# Patient Record
Sex: FEMALE | Race: WHITE
[De-identification: names, ages, dates, MRNs, and addresses within clinical notes are randomized per-mention and may not be internally consistent; named-entity substitution may affect disease eponyms.]

---

## 2019-12-12 ENCOUNTER — HOSPITAL ENCOUNTER (OUTPATIENT)
Dept: HOSPITAL 90 - SHCH | Age: 79
Discharge: HOME | End: 2019-12-12
Attending: INTERNAL MEDICINE
Payer: MEDICARE

## 2019-12-12 DIAGNOSIS — I87.2: ICD-10-CM

## 2019-12-12 DIAGNOSIS — I73.9: Primary | ICD-10-CM

## 2019-12-12 PROCEDURE — 93925 LOWER EXTREMITY STUDY: CPT

## 2019-12-12 PROCEDURE — 93970 EXTREMITY STUDY: CPT

## 2021-03-05 ENCOUNTER — HOSPITAL ENCOUNTER (OUTPATIENT)
Dept: HOSPITAL 90 - RAH | Age: 81
Discharge: HOME | End: 2021-03-05
Attending: INTERNAL MEDICINE
Payer: MEDICARE

## 2021-03-05 DIAGNOSIS — K22.5: Primary | ICD-10-CM

## 2021-03-05 DIAGNOSIS — K44.9: ICD-10-CM

## 2021-03-05 PROCEDURE — 74220 X-RAY XM ESOPHAGUS 1CNTRST: CPT

## 2022-08-08 ENCOUNTER — HOSPITAL ENCOUNTER (EMERGENCY)
Dept: HOSPITAL 90 - EDH | Age: 82
Discharge: HOME | End: 2022-08-08
Payer: MEDICARE

## 2022-08-08 VITALS — WEIGHT: 188.94 LBS | BODY MASS INDEX: 28.63 KG/M2 | HEIGHT: 68 IN

## 2022-08-08 VITALS — DIASTOLIC BLOOD PRESSURE: 86 MMHG | SYSTOLIC BLOOD PRESSURE: 153 MMHG

## 2022-08-08 DIAGNOSIS — I25.10: ICD-10-CM

## 2022-08-08 DIAGNOSIS — E78.00: ICD-10-CM

## 2022-08-08 DIAGNOSIS — Z98.890: ICD-10-CM

## 2022-08-08 DIAGNOSIS — I48.91: ICD-10-CM

## 2022-08-08 DIAGNOSIS — Z79.899: ICD-10-CM

## 2022-08-08 DIAGNOSIS — I10: Primary | ICD-10-CM

## 2022-08-08 LAB
ALBUMIN SERPL-MCNC: 3.4 G/DL (ref 3.5–5)
ALT SERPL-CCNC: 22 U/L (ref 12–78)
AST SERPL-CCNC: 16 U/L (ref 10–37)
BASOPHILS NFR BLD AUTO: 0.8 % (ref 0–5)
BUN SERPL-MCNC: 11 MG/DL (ref 7–18)
CHLORIDE SERPL-SCNC: 94 MMOL/L (ref 101–111)
CO2 SERPL-SCNC: 30 MMOL/L (ref 21–32)
CREAT SERPL-MCNC: 1 MG/DL (ref 0.5–1.5)
EOSINOPHIL NFR BLD AUTO: 2.6 % (ref 0–8)
ERYTHROCYTE [DISTWIDTH] IN BLOOD BY AUTOMATED COUNT: 13.3 % (ref 11–15.5)
GFR SERPL CREATININE-BSD FRML MDRD: 57 ML/MIN (ref 60–?)
GLUCOSE SERPL-MCNC: 113 MG/DL (ref 70–105)
HCT VFR BLD AUTO: 41.6 % (ref 36–48)
LYMPHOCYTES NFR SPEC AUTO: 13.4 % (ref 21–51)
MCH RBC QN AUTO: 32.4 PG (ref 27–33)
MCHC RBC AUTO-ENTMCNC: 34.4 G/DL (ref 32–36)
MCV RBC AUTO: 94.3 FL (ref 79–99)
MONOCYTES NFR BLD AUTO: 9.4 % (ref 3–13)
NEUTROPHILS NFR BLD AUTO: 73.4 % (ref 40–77)
NRBC BLD MANUAL-RTO: 0 % (ref 0–0.19)
PLATELET # BLD AUTO: 269 K/UL (ref 130–400)
POTASSIUM SERPL-SCNC: 5.1 MMOL/L (ref 3.5–5.1)
PROT SERPL-MCNC: 7.2 G/DL (ref 6–8.3)
RBC # BLD AUTO: 4.41 MIL/UL (ref 4–5.5)
SODIUM SERPL-SCNC: 132 MMOL/L (ref 136–145)
WBC # BLD AUTO: 7.8 K/UL (ref 4.8–10.8)

## 2022-08-08 PROCEDURE — 85025 COMPLETE CBC W/AUTO DIFF WBC: CPT

## 2022-08-08 PROCEDURE — 36415 COLL VENOUS BLD VENIPUNCTURE: CPT

## 2022-08-08 PROCEDURE — 84484 ASSAY OF TROPONIN QUANT: CPT

## 2022-08-08 PROCEDURE — 80053 COMPREHEN METABOLIC PANEL: CPT

## 2022-08-12 ENCOUNTER — HOSPITAL ENCOUNTER (EMERGENCY)
Dept: HOSPITAL 90 - EDH | Age: 82
Discharge: HOME | End: 2022-08-12
Payer: MEDICARE

## 2022-08-12 VITALS — DIASTOLIC BLOOD PRESSURE: 105 MMHG | SYSTOLIC BLOOD PRESSURE: 158 MMHG

## 2022-08-12 VITALS — HEIGHT: 68 IN | WEIGHT: 186.95 LBS | BODY MASS INDEX: 28.33 KG/M2

## 2022-08-12 DIAGNOSIS — E86.9: ICD-10-CM

## 2022-08-12 DIAGNOSIS — Z79.899: ICD-10-CM

## 2022-08-12 DIAGNOSIS — J44.9: ICD-10-CM

## 2022-08-12 DIAGNOSIS — E78.00: ICD-10-CM

## 2022-08-12 DIAGNOSIS — I25.10: ICD-10-CM

## 2022-08-12 DIAGNOSIS — I48.91: ICD-10-CM

## 2022-08-12 DIAGNOSIS — I10: Primary | ICD-10-CM

## 2022-08-12 LAB
ALBUMIN SERPL-MCNC: 3.5 G/DL (ref 3.5–5)
ALT SERPL-CCNC: 63 U/L (ref 12–78)
APPEARANCE UR: (no result)
AST SERPL-CCNC: 56 U/L (ref 10–37)
BASOPHILS NFR BLD AUTO: 1.1 % (ref 0–5)
BILIRUB UR QL STRIP: NEGATIVE
BNP SERPL-MCNC: 620 PG/ML (ref 0–100)
BUN SERPL-MCNC: 12 MG/DL (ref 7–18)
CHLORIDE SERPL-SCNC: 95 MMOL/L (ref 101–111)
CO2 SERPL-SCNC: 29 MMOL/L (ref 21–32)
COLOR UR: YELLOW
CREAT SERPL-MCNC: 0.9 MG/DL (ref 0.5–1.5)
DEPRECATED SQUAMOUS URNS QL MICRO: (no result) /HPF (ref 0–2)
EOSINOPHIL NFR BLD AUTO: 3.3 % (ref 0–8)
ERYTHROCYTE [DISTWIDTH] IN BLOOD BY AUTOMATED COUNT: 13.8 % (ref 11–15.5)
GFR SERPL CREATININE-BSD FRML MDRD: 64 ML/MIN (ref 60–?)
GLUCOSE SERPL-MCNC: 107 MG/DL (ref 70–105)
GLUCOSE UR STRIP-MCNC: NEGATIVE MG/DL
HCT VFR BLD AUTO: 42 % (ref 36–48)
HGB UR QL STRIP: NEGATIVE
KETONES UR STRIP-MCNC: NEGATIVE MG/DL
LEUKOCYTE ESTERASE UR QL STRIP: (no result)
LYMPHOCYTES NFR SPEC AUTO: 14.3 % (ref 21–51)
MCH RBC QN AUTO: 32.6 PG (ref 27–33)
MCHC RBC AUTO-ENTMCNC: 34 G/DL (ref 32–36)
MCV RBC AUTO: 95.7 FL (ref 79–99)
MONOCYTES NFR BLD AUTO: 8.4 % (ref 3–13)
NEUTROPHILS NFR BLD AUTO: 72.7 % (ref 40–77)
NITRITE UR QL STRIP: NEGATIVE
NRBC BLD MANUAL-RTO: 0 % (ref 0–0.19)
PH UR STRIP: 6.5 [PH] (ref 5–8)
PLATELET # BLD AUTO: 292 K/UL (ref 130–400)
POTASSIUM SERPL-SCNC: 5.3 MMOL/L (ref 3.5–5.1)
PROT SERPL-MCNC: 7.4 G/DL (ref 6–8.3)
PROT UR QL STRIP: 30 MG/DL
RBC # BLD AUTO: 4.39 MIL/UL (ref 4–5.5)
SODIUM SERPL-SCNC: 130 MMOL/L (ref 136–145)
SP GR UR STRIP: 1.01 (ref 1–1.03)
UROBILINOGEN UR STRIP-MCNC: 1 MG/DL (ref 0.2–1)
WBC # BLD AUTO: 9 K/UL (ref 4.8–10.8)
WBC #/AREA URNS HPF: (no result) /HPF (ref 0–1)

## 2022-08-12 PROCEDURE — 36415 COLL VENOUS BLD VENIPUNCTURE: CPT

## 2022-08-12 PROCEDURE — 87088 URINE BACTERIA CULTURE: CPT

## 2022-08-12 PROCEDURE — 81001 URINALYSIS AUTO W/SCOPE: CPT

## 2022-08-12 PROCEDURE — 85025 COMPLETE CBC W/AUTO DIFF WBC: CPT

## 2022-08-12 PROCEDURE — 83880 ASSAY OF NATRIURETIC PEPTIDE: CPT

## 2022-08-12 PROCEDURE — 84484 ASSAY OF TROPONIN QUANT: CPT

## 2022-08-12 PROCEDURE — 71045 X-RAY EXAM CHEST 1 VIEW: CPT

## 2022-08-12 PROCEDURE — 93005 ELECTROCARDIOGRAM TRACING: CPT

## 2022-08-12 PROCEDURE — 80053 COMPREHEN METABOLIC PANEL: CPT

## 2022-08-25 ENCOUNTER — HOSPITAL ENCOUNTER (EMERGENCY)
Dept: HOSPITAL 90 - EDH | Age: 82
Discharge: HOME | End: 2022-08-25
Payer: MEDICARE

## 2022-08-25 VITALS — WEIGHT: 186.95 LBS | HEIGHT: 68 IN | BODY MASS INDEX: 28.33 KG/M2

## 2022-08-25 VITALS — DIASTOLIC BLOOD PRESSURE: 63 MMHG | SYSTOLIC BLOOD PRESSURE: 139 MMHG

## 2022-08-25 DIAGNOSIS — F41.9: Primary | ICD-10-CM

## 2022-08-25 DIAGNOSIS — I48.91: ICD-10-CM

## 2022-08-25 DIAGNOSIS — Z79.899: ICD-10-CM

## 2022-08-25 DIAGNOSIS — R03.0: ICD-10-CM

## 2022-08-25 DIAGNOSIS — J44.9: ICD-10-CM

## 2022-08-25 LAB
ALBUMIN SERPL-MCNC: 3.3 G/DL (ref 3.5–5)
ALT SERPL-CCNC: 128 U/L (ref 12–78)
AST SERPL-CCNC: 153 U/L (ref 10–37)
BASOPHILS NFR BLD AUTO: 0.8 % (ref 0–5)
BNP SERPL-MCNC: 470 PG/ML (ref 0–100)
BUN SERPL-MCNC: 15 MG/DL (ref 7–18)
CHLORIDE SERPL-SCNC: 96 MMOL/L (ref 101–111)
CO2 SERPL-SCNC: 30 MMOL/L (ref 21–32)
CREAT SERPL-MCNC: 1 MG/DL (ref 0.5–1.5)
EOSINOPHIL NFR BLD AUTO: 2.3 % (ref 0–8)
ERYTHROCYTE [DISTWIDTH] IN BLOOD BY AUTOMATED COUNT: 13.9 % (ref 11–15.5)
GFR SERPL CREATININE-BSD FRML MDRD: 57 ML/MIN (ref 60–?)
GLUCOSE SERPL-MCNC: 108 MG/DL (ref 70–105)
HCT VFR BLD AUTO: 43.1 % (ref 36–48)
LYMPHOCYTES NFR SPEC AUTO: 10.7 % (ref 21–51)
MAGNESIUM SERPL-MCNC: 1.6 MG/DL (ref 1.8–2.4)
MCH RBC QN AUTO: 32.3 PG (ref 27–33)
MCHC RBC AUTO-ENTMCNC: 33.4 G/DL (ref 32–36)
MCV RBC AUTO: 96.6 FL (ref 79–99)
MONOCYTES NFR BLD AUTO: 9.4 % (ref 3–13)
NEUTROPHILS NFR BLD AUTO: 76.4 % (ref 40–77)
NRBC BLD MANUAL-RTO: 0 % (ref 0–0.19)
PLATELET # BLD AUTO: 284 K/UL (ref 130–400)
POTASSIUM SERPL-SCNC: 4.6 MMOL/L (ref 3.5–5.1)
PROT SERPL-MCNC: 6.8 G/DL (ref 6–8.3)
RBC # BLD AUTO: 4.46 MIL/UL (ref 4–5.5)
SODIUM SERPL-SCNC: 134 MMOL/L (ref 136–145)
WBC # BLD AUTO: 8.5 K/UL (ref 4.8–10.8)

## 2022-08-25 PROCEDURE — 80053 COMPREHEN METABOLIC PANEL: CPT

## 2022-08-25 PROCEDURE — 96374 THER/PROPH/DIAG INJ IV PUSH: CPT

## 2022-08-25 PROCEDURE — 83880 ASSAY OF NATRIURETIC PEPTIDE: CPT

## 2022-08-25 PROCEDURE — 99285 EMERGENCY DEPT VISIT HI MDM: CPT

## 2022-08-25 PROCEDURE — 71045 X-RAY EXAM CHEST 1 VIEW: CPT

## 2022-08-25 PROCEDURE — 83735 ASSAY OF MAGNESIUM: CPT

## 2022-08-25 PROCEDURE — 85025 COMPLETE CBC W/AUTO DIFF WBC: CPT

## 2022-08-25 PROCEDURE — 36415 COLL VENOUS BLD VENIPUNCTURE: CPT

## 2022-08-25 PROCEDURE — 93005 ELECTROCARDIOGRAM TRACING: CPT

## 2022-08-25 PROCEDURE — 84484 ASSAY OF TROPONIN QUANT: CPT

## 2022-08-28 ENCOUNTER — HOSPITAL ENCOUNTER (INPATIENT)
Dept: HOSPITAL 90 - EDH | Age: 82
LOS: 5 days | Discharge: HOME | DRG: 291 | End: 2022-09-02
Attending: INTERNAL MEDICINE | Admitting: INTERNAL MEDICINE
Payer: MEDICARE

## 2022-08-28 VITALS — WEIGHT: 181.4 LBS | BODY MASS INDEX: 29.15 KG/M2 | HEIGHT: 66 IN

## 2022-08-28 VITALS — SYSTOLIC BLOOD PRESSURE: 155 MMHG | DIASTOLIC BLOOD PRESSURE: 88 MMHG

## 2022-08-28 VITALS — DIASTOLIC BLOOD PRESSURE: 105 MMHG | SYSTOLIC BLOOD PRESSURE: 168 MMHG

## 2022-08-28 DIAGNOSIS — Z90.710: ICD-10-CM

## 2022-08-28 DIAGNOSIS — L97.919: ICD-10-CM

## 2022-08-28 DIAGNOSIS — E78.00: ICD-10-CM

## 2022-08-28 DIAGNOSIS — Z87.891: ICD-10-CM

## 2022-08-28 DIAGNOSIS — Z79.01: ICD-10-CM

## 2022-08-28 DIAGNOSIS — Z79.899: ICD-10-CM

## 2022-08-28 DIAGNOSIS — Z20.822: ICD-10-CM

## 2022-08-28 DIAGNOSIS — I25.10: ICD-10-CM

## 2022-08-28 DIAGNOSIS — Z90.11: ICD-10-CM

## 2022-08-28 DIAGNOSIS — J44.9: ICD-10-CM

## 2022-08-28 DIAGNOSIS — Z85.3: ICD-10-CM

## 2022-08-28 DIAGNOSIS — I48.0: ICD-10-CM

## 2022-08-28 DIAGNOSIS — F41.9: ICD-10-CM

## 2022-08-28 DIAGNOSIS — I16.1: ICD-10-CM

## 2022-08-28 DIAGNOSIS — Z96.653: ICD-10-CM

## 2022-08-28 DIAGNOSIS — I11.0: Primary | ICD-10-CM

## 2022-08-28 DIAGNOSIS — F32.A: ICD-10-CM

## 2022-08-28 DIAGNOSIS — E66.9: ICD-10-CM

## 2022-08-28 DIAGNOSIS — M19.90: ICD-10-CM

## 2022-08-28 DIAGNOSIS — I50.43: ICD-10-CM

## 2022-08-28 LAB
ALBUMIN SERPL-MCNC: 3.4 G/DL (ref 3.5–5)
ALT SERPL-CCNC: 113 U/L (ref 12–78)
APPEARANCE UR: CLEAR
AST SERPL-CCNC: 72 U/L (ref 10–37)
BASOPHILS NFR BLD AUTO: 0.5 % (ref 0–5)
BILIRUB UR QL STRIP: NEGATIVE
BUN SERPL-MCNC: 18 MG/DL (ref 7–18)
CHLORIDE SERPL-SCNC: 95 MMOL/L (ref 101–111)
CK SERPL-CCNC: 41 U/L (ref 21–232)
CK SERPL-CCNC: 62 U/L (ref 21–232)
CO2 SERPL-SCNC: 28 MMOL/L (ref 21–32)
COLOR UR: YELLOW
CREAT SERPL-MCNC: 1.1 MG/DL (ref 0.5–1.5)
EOSINOPHIL NFR BLD AUTO: 0.7 % (ref 0–8)
ERYTHROCYTE [DISTWIDTH] IN BLOOD BY AUTOMATED COUNT: 13.8 % (ref 11–15.5)
GFR SERPL CREATININE-BSD FRML MDRD: 51 ML/MIN (ref 60–?)
GLUCOSE SERPL-MCNC: 131 MG/DL (ref 70–105)
GLUCOSE UR STRIP-MCNC: NEGATIVE MG/DL
HCT VFR BLD AUTO: 46.6 % (ref 36–48)
HGB UR QL STRIP: (no result)
HYALINE CASTS URNS QL MICRO: (no result) /LPF
KETONES UR STRIP-MCNC: NEGATIVE MG/DL
LEUKOCYTE ESTERASE UR QL STRIP: (no result)
LYMPHOCYTES NFR SPEC AUTO: 8.3 % (ref 21–51)
MCH RBC QN AUTO: 32.4 PG (ref 27–33)
MCHC RBC AUTO-ENTMCNC: 33.5 G/DL (ref 32–36)
MCV RBC AUTO: 96.7 FL (ref 79–99)
MONOCYTES NFR BLD AUTO: 9.3 % (ref 3–13)
MYOGLOBIN SERPL-MCNC: 58 NG/ML (ref 10–92)
MYOGLOBIN SERPL-MCNC: 99 NG/ML (ref 10–92)
NEUTROPHILS NFR BLD AUTO: 80.9 % (ref 40–77)
NITRITE UR QL STRIP: NEGATIVE
NRBC BLD MANUAL-RTO: 0 % (ref 0–0.19)
PH UR STRIP: 6 [PH] (ref 5–8)
PLATELET # BLD AUTO: 288 K/UL (ref 130–400)
POTASSIUM SERPL-SCNC: 4.5 MMOL/L (ref 3.5–5.1)
PROT SERPL-MCNC: 6.9 G/DL (ref 6–8.3)
PROT UR QL STRIP: 100 MG/DL
RBC # BLD AUTO: 4.82 MIL/UL (ref 4–5.5)
RBC #/AREA URNS HPF: (no result) /HPF (ref 0–1)
SODIUM SERPL-SCNC: 130 MMOL/L (ref 136–145)
SP GR UR STRIP: 1.01 (ref 1–1.03)
UROBILINOGEN UR STRIP-MCNC: 0.2 MG/DL (ref 0.2–1)
WBC # BLD AUTO: 9.9 K/UL (ref 4.8–10.8)
WBC #/AREA URNS HPF: (no result) /HPF (ref 0–1)

## 2022-08-28 PROCEDURE — 36415 COLL VENOUS BLD VENIPUNCTURE: CPT

## 2022-08-28 PROCEDURE — 83874 ASSAY OF MYOGLOBIN: CPT

## 2022-08-28 PROCEDURE — 85027 COMPLETE CBC AUTOMATED: CPT

## 2022-08-28 PROCEDURE — 87088 URINE BACTERIA CULTURE: CPT

## 2022-08-28 PROCEDURE — 93925 LOWER EXTREMITY STUDY: CPT

## 2022-08-28 PROCEDURE — 82550 ASSAY OF CK (CPK): CPT

## 2022-08-28 PROCEDURE — 94640 AIRWAY INHALATION TREATMENT: CPT

## 2022-08-28 PROCEDURE — 93017 CV STRESS TEST TRACING ONLY: CPT

## 2022-08-28 PROCEDURE — 83880 ASSAY OF NATRIURETIC PEPTIDE: CPT

## 2022-08-28 PROCEDURE — 80053 COMPREHEN METABOLIC PANEL: CPT

## 2022-08-28 PROCEDURE — 87635 SARS-COV-2 COVID-19 AMP PRB: CPT

## 2022-08-28 PROCEDURE — 71045 X-RAY EXAM CHEST 1 VIEW: CPT

## 2022-08-28 PROCEDURE — 81001 URINALYSIS AUTO W/SCOPE: CPT

## 2022-08-28 PROCEDURE — 93005 ELECTROCARDIOGRAM TRACING: CPT

## 2022-08-28 PROCEDURE — 80048 BASIC METABOLIC PNL TOTAL CA: CPT

## 2022-08-28 PROCEDURE — 84484 ASSAY OF TROPONIN QUANT: CPT

## 2022-08-28 PROCEDURE — 96374 THER/PROPH/DIAG INJ IV PUSH: CPT

## 2022-08-28 PROCEDURE — 94664 DEMO&/EVAL PT USE INHALER: CPT

## 2022-08-28 PROCEDURE — 78452 HT MUSCLE IMAGE SPECT MULT: CPT

## 2022-08-28 PROCEDURE — 93306 TTE W/DOPPLER COMPLETE: CPT

## 2022-08-28 PROCEDURE — 85025 COMPLETE CBC W/AUTO DIFF WBC: CPT

## 2022-08-28 PROCEDURE — 83735 ASSAY OF MAGNESIUM: CPT

## 2022-08-28 RX ADMIN — METOPROLOL TARTRATE SCH MG: 50 TABLET, FILM COATED ORAL at 21:00

## 2022-08-28 RX ADMIN — TRAZODONE HYDROCHLORIDE SCH MG: 100 TABLET ORAL at 22:49

## 2022-08-28 RX ADMIN — FUROSEMIDE SCH MG: 10 INJECTION, SOLUTION INTRAMUSCULAR; INTRAVENOUS at 19:11

## 2022-08-28 RX ADMIN — METOPROLOL TARTRATE SCH MG: 50 TABLET, FILM COATED ORAL at 22:49

## 2022-08-29 VITALS — DIASTOLIC BLOOD PRESSURE: 95 MMHG | SYSTOLIC BLOOD PRESSURE: 152 MMHG

## 2022-08-29 VITALS — SYSTOLIC BLOOD PRESSURE: 143 MMHG | DIASTOLIC BLOOD PRESSURE: 91 MMHG

## 2022-08-29 VITALS — DIASTOLIC BLOOD PRESSURE: 74 MMHG | SYSTOLIC BLOOD PRESSURE: 138 MMHG

## 2022-08-29 VITALS — DIASTOLIC BLOOD PRESSURE: 90 MMHG | SYSTOLIC BLOOD PRESSURE: 147 MMHG

## 2022-08-29 VITALS — SYSTOLIC BLOOD PRESSURE: 175 MMHG | DIASTOLIC BLOOD PRESSURE: 94 MMHG

## 2022-08-29 VITALS — SYSTOLIC BLOOD PRESSURE: 159 MMHG | DIASTOLIC BLOOD PRESSURE: 90 MMHG

## 2022-08-29 LAB
BNP SERPL-MCNC: 719 PG/ML (ref 0–100)
CK SERPL-CCNC: 46 U/L (ref 21–232)
ERYTHROCYTE [DISTWIDTH] IN BLOOD BY AUTOMATED COUNT: 13.9 % (ref 11–15.5)
HCT VFR BLD AUTO: 44.3 % (ref 36–48)
MAGNESIUM SERPL-MCNC: 1.3 MG/DL (ref 1.8–2.4)
MCH RBC QN AUTO: 32.5 PG (ref 27–33)
MCHC RBC AUTO-ENTMCNC: 34.3 G/DL (ref 32–36)
MCV RBC AUTO: 94.9 FL (ref 79–99)
MYOGLOBIN SERPL-MCNC: 74 NG/ML (ref 10–92)
NRBC BLD MANUAL-RTO: 0 % (ref 0–0.19)
PLATELET # BLD AUTO: 240 K/UL (ref 130–400)
RBC # BLD AUTO: 4.67 MIL/UL (ref 4–5.5)
WBC # BLD AUTO: 6.8 K/UL (ref 4.8–10.8)

## 2022-08-29 RX ADMIN — FUROSEMIDE SCH MG: 10 INJECTION, SOLUTION INTRAMUSCULAR; INTRAVENOUS at 17:05

## 2022-08-29 RX ADMIN — RIVAROXABAN SCH MG: 20 TABLET, FILM COATED ORAL at 08:46

## 2022-08-29 RX ADMIN — IPRATROPIUM BROMIDE SCH MG: 0.5 SOLUTION RESPIRATORY (INHALATION) at 23:35

## 2022-08-29 RX ADMIN — DULOXETINE HYDROCHLORIDE SCH MG: 30 CAPSULE, DELAYED RELEASE ORAL at 08:46

## 2022-08-29 RX ADMIN — PANTOPRAZOLE SODIUM SCH MG: 40 TABLET, DELAYED RELEASE ORAL at 08:45

## 2022-08-29 RX ADMIN — TRAZODONE HYDROCHLORIDE SCH MG: 100 TABLET ORAL at 20:03

## 2022-08-29 RX ADMIN — IPRATROPIUM BROMIDE SCH MG: 0.5 SOLUTION RESPIRATORY (INHALATION) at 11:14

## 2022-08-29 RX ADMIN — METOPROLOL TARTRATE SCH MG: 50 TABLET, FILM COATED ORAL at 08:47

## 2022-08-29 RX ADMIN — LOSARTAN POTASSIUM SCH MG: 100 TABLET, FILM COATED ORAL at 20:04

## 2022-08-29 RX ADMIN — MAGNESIUM SULFATE IN WATER PRN MLS/HR: 40 INJECTION, SOLUTION INTRAVENOUS at 10:08

## 2022-08-29 RX ADMIN — METOPROLOL TARTRATE SCH MG: 50 TABLET, FILM COATED ORAL at 07:34

## 2022-08-29 RX ADMIN — SIMVASTATIN SCH MG: 20 TABLET, FILM COATED ORAL at 20:04

## 2022-08-29 RX ADMIN — CHLORTHALIDONE SCH EACH: 50 TABLET ORAL at 20:04

## 2022-08-29 RX ADMIN — FERROUS SULFATE TAB EC 324 MG (65 MG FE EQUIVALENT) SCH MG: 324 (65 FE) TABLET DELAYED RESPONSE at 20:03

## 2022-08-29 RX ADMIN — IPRATROPIUM BROMIDE SCH MG: 0.5 SOLUTION RESPIRATORY (INHALATION) at 19:11

## 2022-08-29 RX ADMIN — METOPROLOL TARTRATE SCH MG: 50 TABLET, FILM COATED ORAL at 20:04

## 2022-08-29 RX ADMIN — Medication SCH TAB: at 20:04

## 2022-08-29 RX ADMIN — Medication SCH MG: at 20:04

## 2022-08-30 VITALS — SYSTOLIC BLOOD PRESSURE: 151 MMHG | DIASTOLIC BLOOD PRESSURE: 94 MMHG

## 2022-08-30 VITALS — DIASTOLIC BLOOD PRESSURE: 69 MMHG | SYSTOLIC BLOOD PRESSURE: 117 MMHG

## 2022-08-30 VITALS — SYSTOLIC BLOOD PRESSURE: 148 MMHG | DIASTOLIC BLOOD PRESSURE: 95 MMHG

## 2022-08-30 VITALS — DIASTOLIC BLOOD PRESSURE: 77 MMHG | SYSTOLIC BLOOD PRESSURE: 145 MMHG

## 2022-08-30 VITALS — DIASTOLIC BLOOD PRESSURE: 74 MMHG | SYSTOLIC BLOOD PRESSURE: 151 MMHG

## 2022-08-30 VITALS — SYSTOLIC BLOOD PRESSURE: 164 MMHG | DIASTOLIC BLOOD PRESSURE: 88 MMHG

## 2022-08-30 LAB
BUN SERPL-MCNC: 13 MG/DL (ref 7–18)
CHLORIDE SERPL-SCNC: 100 MMOL/L (ref 101–111)
CO2 SERPL-SCNC: 32 MMOL/L (ref 21–32)
CREAT SERPL-MCNC: 1 MG/DL (ref 0.5–1.5)
ERYTHROCYTE [DISTWIDTH] IN BLOOD BY AUTOMATED COUNT: 13.8 % (ref 11–15.5)
GFR SERPL CREATININE-BSD FRML MDRD: 57 ML/MIN (ref 60–?)
GLUCOSE SERPL-MCNC: 101 MG/DL (ref 70–105)
HCT VFR BLD AUTO: 44 % (ref 36–48)
MAGNESIUM SERPL-MCNC: 1.5 MG/DL (ref 1.8–2.4)
MCH RBC QN AUTO: 32 PG (ref 27–33)
MCHC RBC AUTO-ENTMCNC: 33.6 G/DL (ref 32–36)
MCV RBC AUTO: 95.2 FL (ref 79–99)
NRBC BLD MANUAL-RTO: 0 % (ref 0–0.19)
PLATELET # BLD AUTO: 263 K/UL (ref 130–400)
POTASSIUM SERPL-SCNC: 3.2 MMOL/L (ref 3.5–5.1)
RBC # BLD AUTO: 4.62 MIL/UL (ref 4–5.5)
SODIUM SERPL-SCNC: 141 MMOL/L (ref 136–145)
WBC # BLD AUTO: 7.6 K/UL (ref 4.8–10.8)

## 2022-08-30 RX ADMIN — AMLODIPINE BESYLATE SCH MG: 5 TABLET ORAL at 12:19

## 2022-08-30 RX ADMIN — SIMVASTATIN SCH MG: 20 TABLET, FILM COATED ORAL at 20:31

## 2022-08-30 RX ADMIN — TRAZODONE HYDROCHLORIDE SCH MG: 100 TABLET ORAL at 20:31

## 2022-08-30 RX ADMIN — MAGNESIUM SULFATE IN WATER PRN MLS/HR: 40 INJECTION, SOLUTION INTRAVENOUS at 04:37

## 2022-08-30 RX ADMIN — METOPROLOL TARTRATE SCH MG: 50 TABLET, FILM COATED ORAL at 20:31

## 2022-08-30 RX ADMIN — CHLORTHALIDONE SCH EACH: 50 TABLET ORAL at 20:37

## 2022-08-30 RX ADMIN — IPRATROPIUM BROMIDE SCH MG: 0.5 SOLUTION RESPIRATORY (INHALATION) at 11:07

## 2022-08-30 RX ADMIN — LOSARTAN POTASSIUM SCH MG: 100 TABLET, FILM COATED ORAL at 20:31

## 2022-08-30 RX ADMIN — Medication SCH MG: at 10:12

## 2022-08-30 RX ADMIN — IPRATROPIUM BROMIDE SCH MG: 0.5 SOLUTION RESPIRATORY (INHALATION) at 06:36

## 2022-08-30 RX ADMIN — Medication SCH MG: at 20:31

## 2022-08-30 RX ADMIN — METOPROLOL TARTRATE SCH MG: 50 TABLET, FILM COATED ORAL at 10:13

## 2022-08-30 RX ADMIN — POTASSIUM CHLORIDE PRN MEQ: 1500 TABLET, EXTENDED RELEASE ORAL at 06:34

## 2022-08-30 RX ADMIN — RIVAROXABAN SCH MG: 20 TABLET, FILM COATED ORAL at 10:12

## 2022-08-30 RX ADMIN — Medication SCH TAB: at 20:31

## 2022-08-30 RX ADMIN — POTASSIUM CHLORIDE PRN MEQ: 1500 TABLET, EXTENDED RELEASE ORAL at 11:15

## 2022-08-30 RX ADMIN — FERROUS SULFATE TAB EC 324 MG (65 MG FE EQUIVALENT) SCH MG: 324 (65 FE) TABLET DELAYED RESPONSE at 20:31

## 2022-08-30 RX ADMIN — DULOXETINE HYDROCHLORIDE SCH MG: 30 CAPSULE, DELAYED RELEASE ORAL at 10:12

## 2022-08-30 RX ADMIN — POTASSIUM CHLORIDE PRN MEQ: 1500 TABLET, EXTENDED RELEASE ORAL at 04:43

## 2022-08-30 RX ADMIN — PANTOPRAZOLE SODIUM SCH MG: 40 TABLET, DELAYED RELEASE ORAL at 10:12

## 2022-08-31 VITALS — SYSTOLIC BLOOD PRESSURE: 157 MMHG | DIASTOLIC BLOOD PRESSURE: 77 MMHG

## 2022-08-31 VITALS — SYSTOLIC BLOOD PRESSURE: 142 MMHG | DIASTOLIC BLOOD PRESSURE: 102 MMHG

## 2022-08-31 VITALS — DIASTOLIC BLOOD PRESSURE: 73 MMHG | SYSTOLIC BLOOD PRESSURE: 131 MMHG

## 2022-08-31 VITALS — SYSTOLIC BLOOD PRESSURE: 135 MMHG | DIASTOLIC BLOOD PRESSURE: 89 MMHG

## 2022-08-31 VITALS — DIASTOLIC BLOOD PRESSURE: 80 MMHG | SYSTOLIC BLOOD PRESSURE: 140 MMHG

## 2022-08-31 VITALS — SYSTOLIC BLOOD PRESSURE: 137 MMHG | DIASTOLIC BLOOD PRESSURE: 76 MMHG

## 2022-08-31 VITALS — DIASTOLIC BLOOD PRESSURE: 98 MMHG | SYSTOLIC BLOOD PRESSURE: 149 MMHG

## 2022-08-31 VITALS — SYSTOLIC BLOOD PRESSURE: 138 MMHG | DIASTOLIC BLOOD PRESSURE: 58 MMHG

## 2022-08-31 LAB
BUN SERPL-MCNC: 12 MG/DL (ref 7–18)
CHLORIDE SERPL-SCNC: 102 MMOL/L (ref 101–111)
CO2 SERPL-SCNC: 30 MMOL/L (ref 21–32)
CREAT SERPL-MCNC: 0.9 MG/DL (ref 0.5–1.5)
GFR SERPL CREATININE-BSD FRML MDRD: 64 ML/MIN (ref 60–?)
GLUCOSE SERPL-MCNC: 111 MG/DL (ref 70–105)
MAGNESIUM SERPL-MCNC: 1.9 MG/DL (ref 1.8–2.4)
POTASSIUM SERPL-SCNC: 3.9 MMOL/L (ref 3.5–5.1)
SODIUM SERPL-SCNC: 139 MMOL/L (ref 136–145)

## 2022-08-31 RX ADMIN — POTASSIUM CHLORIDE SCH MEQ: 1500 TABLET, EXTENDED RELEASE ORAL at 09:26

## 2022-08-31 RX ADMIN — Medication SCH MG: at 20:12

## 2022-08-31 RX ADMIN — METOPROLOL TARTRATE SCH MG: 50 TABLET, FILM COATED ORAL at 20:12

## 2022-08-31 RX ADMIN — METOPROLOL TARTRATE SCH MG: 50 TABLET, FILM COATED ORAL at 09:17

## 2022-08-31 RX ADMIN — Medication SCH MG: at 14:03

## 2022-08-31 RX ADMIN — DULOXETINE HYDROCHLORIDE SCH MG: 30 CAPSULE, DELAYED RELEASE ORAL at 09:17

## 2022-08-31 RX ADMIN — Medication SCH MG: at 09:27

## 2022-08-31 RX ADMIN — Medication SCH MG: at 09:26

## 2022-08-31 RX ADMIN — SIMVASTATIN SCH MG: 20 TABLET, FILM COATED ORAL at 20:12

## 2022-08-31 RX ADMIN — TRAZODONE HYDROCHLORIDE SCH MG: 100 TABLET ORAL at 20:12

## 2022-08-31 RX ADMIN — PANTOPRAZOLE SODIUM SCH MG: 40 TABLET, DELAYED RELEASE ORAL at 09:17

## 2022-08-31 RX ADMIN — Medication SCH TAB: at 20:12

## 2022-08-31 RX ADMIN — RIVAROXABAN SCH MG: 20 TABLET, FILM COATED ORAL at 09:16

## 2022-08-31 RX ADMIN — AMLODIPINE BESYLATE SCH MG: 5 TABLET ORAL at 09:17

## 2022-08-31 RX ADMIN — Medication SCH MG: at 20:16

## 2022-08-31 RX ADMIN — CHLORTHALIDONE SCH EACH: 50 TABLET ORAL at 20:13

## 2022-08-31 RX ADMIN — FERROUS SULFATE TAB EC 324 MG (65 MG FE EQUIVALENT) SCH MG: 324 (65 FE) TABLET DELAYED RESPONSE at 20:11

## 2022-08-31 RX ADMIN — HYDROCHLOROTHIAZIDE SCH MG: 25 TABLET ORAL at 09:17

## 2022-08-31 RX ADMIN — LOSARTAN POTASSIUM SCH MG: 100 TABLET, FILM COATED ORAL at 20:12

## 2022-08-31 RX ADMIN — Medication SCH MG: at 20:11

## 2022-09-01 VITALS — SYSTOLIC BLOOD PRESSURE: 122 MMHG | DIASTOLIC BLOOD PRESSURE: 83 MMHG

## 2022-09-01 VITALS — DIASTOLIC BLOOD PRESSURE: 74 MMHG | SYSTOLIC BLOOD PRESSURE: 121 MMHG

## 2022-09-01 VITALS — SYSTOLIC BLOOD PRESSURE: 121 MMHG | DIASTOLIC BLOOD PRESSURE: 71 MMHG

## 2022-09-01 VITALS — DIASTOLIC BLOOD PRESSURE: 81 MMHG | SYSTOLIC BLOOD PRESSURE: 124 MMHG

## 2022-09-01 VITALS — SYSTOLIC BLOOD PRESSURE: 111 MMHG | DIASTOLIC BLOOD PRESSURE: 72 MMHG

## 2022-09-01 VITALS — SYSTOLIC BLOOD PRESSURE: 125 MMHG | DIASTOLIC BLOOD PRESSURE: 69 MMHG

## 2022-09-01 LAB
ALBUMIN SERPL-MCNC: 2.9 G/DL (ref 3.5–5)
ALT SERPL-CCNC: 46 U/L (ref 12–78)
AST SERPL-CCNC: 20 U/L (ref 10–37)
BUN SERPL-MCNC: 9 MG/DL (ref 7–18)
CHLORIDE SERPL-SCNC: 101 MMOL/L (ref 101–111)
CO2 SERPL-SCNC: 29 MMOL/L (ref 21–32)
CREAT SERPL-MCNC: 0.8 MG/DL (ref 0.5–1.5)
ERYTHROCYTE [DISTWIDTH] IN BLOOD BY AUTOMATED COUNT: 13.8 % (ref 11–15.5)
GFR SERPL CREATININE-BSD FRML MDRD: 73 ML/MIN (ref 60–?)
GLUCOSE SERPL-MCNC: 116 MG/DL (ref 70–105)
HCT VFR BLD AUTO: 43.7 % (ref 36–48)
MAGNESIUM SERPL-MCNC: 1.7 MG/DL (ref 1.8–2.4)
MCH RBC QN AUTO: 31.8 PG (ref 27–33)
MCHC RBC AUTO-ENTMCNC: 32.5 G/DL (ref 32–36)
MCV RBC AUTO: 98 FL (ref 79–99)
NRBC BLD MANUAL-RTO: 0 % (ref 0–0.19)
PLATELET # BLD AUTO: 272 K/UL (ref 130–400)
POTASSIUM SERPL-SCNC: 4.1 MMOL/L (ref 3.5–5.1)
PROT SERPL-MCNC: 6.4 G/DL (ref 6–8.3)
RBC # BLD AUTO: 4.46 MIL/UL (ref 4–5.5)
SODIUM SERPL-SCNC: 137 MMOL/L (ref 136–145)
WBC # BLD AUTO: 7.7 K/UL (ref 4.8–10.8)

## 2022-09-01 RX ADMIN — TRAZODONE HYDROCHLORIDE SCH MG: 100 TABLET ORAL at 22:01

## 2022-09-01 RX ADMIN — LOSARTAN POTASSIUM SCH MG: 100 TABLET, FILM COATED ORAL at 21:58

## 2022-09-01 RX ADMIN — MAGNESIUM SULFATE IN WATER PRN MLS/HR: 40 INJECTION, SOLUTION INTRAVENOUS at 11:43

## 2022-09-01 RX ADMIN — Medication SCH MG: at 09:01

## 2022-09-01 RX ADMIN — DULOXETINE HYDROCHLORIDE SCH MG: 30 CAPSULE, DELAYED RELEASE ORAL at 09:00

## 2022-09-01 RX ADMIN — Medication SCH MG: at 21:57

## 2022-09-01 RX ADMIN — HYDROCHLOROTHIAZIDE SCH MG: 25 TABLET ORAL at 09:02

## 2022-09-01 RX ADMIN — Medication SCH MG: at 21:58

## 2022-09-01 RX ADMIN — FUROSEMIDE SCH MG: 20 TABLET ORAL at 09:01

## 2022-09-01 RX ADMIN — AMLODIPINE BESYLATE SCH MG: 5 TABLET ORAL at 09:02

## 2022-09-01 RX ADMIN — Medication SCH MG: at 14:34

## 2022-09-01 RX ADMIN — CHLORTHALIDONE SCH EACH: 50 TABLET ORAL at 21:00

## 2022-09-01 RX ADMIN — RIVAROXABAN SCH MG: 20 TABLET, FILM COATED ORAL at 08:59

## 2022-09-01 RX ADMIN — POTASSIUM CHLORIDE SCH MEQ: 1500 TABLET, EXTENDED RELEASE ORAL at 09:07

## 2022-09-01 RX ADMIN — PANTOPRAZOLE SODIUM SCH MG: 40 TABLET, DELAYED RELEASE ORAL at 09:00

## 2022-09-01 RX ADMIN — FERROUS SULFATE TAB EC 324 MG (65 MG FE EQUIVALENT) SCH MG: 324 (65 FE) TABLET DELAYED RESPONSE at 21:59

## 2022-09-01 RX ADMIN — Medication SCH MG: at 09:00

## 2022-09-01 RX ADMIN — METOPROLOL TARTRATE SCH MG: 50 TABLET, FILM COATED ORAL at 21:59

## 2022-09-01 RX ADMIN — SIMVASTATIN SCH MG: 20 TABLET, FILM COATED ORAL at 21:57

## 2022-09-01 RX ADMIN — METOPROLOL TARTRATE SCH MG: 50 TABLET, FILM COATED ORAL at 08:59

## 2022-09-01 RX ADMIN — Medication SCH TAB: at 21:58

## 2022-09-02 VITALS — SYSTOLIC BLOOD PRESSURE: 116 MMHG | DIASTOLIC BLOOD PRESSURE: 76 MMHG

## 2022-09-02 VITALS — SYSTOLIC BLOOD PRESSURE: 127 MMHG | DIASTOLIC BLOOD PRESSURE: 62 MMHG

## 2022-09-02 VITALS — SYSTOLIC BLOOD PRESSURE: 124 MMHG | DIASTOLIC BLOOD PRESSURE: 85 MMHG

## 2022-09-02 RX ADMIN — AMLODIPINE BESYLATE SCH MG: 5 TABLET ORAL at 09:10

## 2022-09-02 RX ADMIN — Medication SCH MG: at 09:09

## 2022-09-02 RX ADMIN — HYDROCHLOROTHIAZIDE SCH MG: 25 TABLET ORAL at 09:09

## 2022-09-02 RX ADMIN — PANTOPRAZOLE SODIUM SCH MG: 40 TABLET, DELAYED RELEASE ORAL at 09:21

## 2022-09-02 RX ADMIN — Medication SCH MG: at 09:08

## 2022-09-02 RX ADMIN — METOPROLOL TARTRATE SCH MG: 50 TABLET, FILM COATED ORAL at 09:21

## 2022-09-02 RX ADMIN — DULOXETINE HYDROCHLORIDE SCH MG: 30 CAPSULE, DELAYED RELEASE ORAL at 09:09

## 2022-09-02 RX ADMIN — POTASSIUM CHLORIDE SCH MEQ: 1500 TABLET, EXTENDED RELEASE ORAL at 09:21

## 2022-09-02 RX ADMIN — FUROSEMIDE SCH MG: 20 TABLET ORAL at 09:10

## 2022-09-02 RX ADMIN — Medication SCH MG: at 15:03

## 2022-09-02 RX ADMIN — RIVAROXABAN SCH MG: 20 TABLET, FILM COATED ORAL at 09:08

## 2022-09-23 LAB
APPEARANCE UR: CLEAR
APTT PPP: 27.6 SEC (ref 26.3–35.5)
BASOPHILS NFR BLD AUTO: 0.6 % (ref 0–5)
BILIRUB UR QL STRIP: NEGATIVE MG/DL
BNP SERPL-MCNC: 388 PG/ML (ref 0–100)
BUN SERPL-MCNC: 18 MG/DL (ref 7–18)
CHLORIDE SERPL-SCNC: 97 MMOL/L (ref 101–111)
CO2 SERPL-SCNC: 33 MMOL/L (ref 21–32)
COLOR UR: YELLOW
CREAT SERPL-MCNC: 1.1 MG/DL (ref 0.5–1.5)
DEPRECATED SQUAMOUS URNS QL MICRO: (no result) /HPF (ref 0–2)
EOSINOPHIL NFR BLD AUTO: 2.6 % (ref 0–8)
ERYTHROCYTE [DISTWIDTH] IN BLOOD BY AUTOMATED COUNT: 13.3 % (ref 11–15.5)
GFR SERPL CREATININE-BSD FRML MDRD: 51 ML/MIN (ref 60–?)
GLUCOSE SERPL-MCNC: 114 MG/DL (ref 70–105)
GLUCOSE UR STRIP-MCNC: NEGATIVE MG/DL
HCT VFR BLD AUTO: 46.6 % (ref 36–48)
HGB UR QL STRIP: NEGATIVE
INR PPP: 1 (ref 0.85–1.15)
KETONES UR STRIP-MCNC: NEGATIVE MG/DL
LEUKOCYTE ESTERASE UR QL STRIP: 75 LEU/UL
LYMPHOCYTES NFR SPEC AUTO: 17.4 % (ref 21–51)
MCH RBC QN AUTO: 32 PG (ref 27–33)
MCHC RBC AUTO-ENTMCNC: 33.5 G/DL (ref 32–36)
MCV RBC AUTO: 95.7 FL (ref 79–99)
MONOCYTES NFR BLD AUTO: 8.3 % (ref 3–13)
NEUTROPHILS NFR BLD AUTO: 71 % (ref 40–77)
NITRITE UR QL STRIP: NEGATIVE
NRBC BLD MANUAL-RTO: 0 % (ref 0–0.19)
PH UR STRIP: 6 [PH] (ref 5–8)
PLATELET # BLD AUTO: 301 K/UL (ref 130–400)
POTASSIUM SERPL-SCNC: 5.2 MMOL/L (ref 3.5–5.1)
PROT UR QL STRIP: 10 MG/DL
PROTHROMBIN TIME: 10.9 SEC (ref 9.6–11.6)
RBC # BLD AUTO: 4.87 MIL/UL (ref 4–5.5)
RBC #/AREA URNS HPF: (no result) /HPF (ref 0–1)
SODIUM SERPL-SCNC: 135 MMOL/L (ref 136–145)
SP GR UR STRIP: 1.02 (ref 1–1.03)
UROBILINOGEN UR STRIP-MCNC: 0.2 MG/DL (ref 0.2–1)
WBC # BLD AUTO: 8.1 K/UL (ref 4.8–10.8)
WBC #/AREA URNS HPF: (no result) /HPF (ref 0–1)

## 2022-09-26 VITALS — SYSTOLIC BLOOD PRESSURE: 142 MMHG | DIASTOLIC BLOOD PRESSURE: 71 MMHG

## 2022-09-27 ENCOUNTER — HOSPITAL ENCOUNTER (OUTPATIENT)
Dept: HOSPITAL 90 - DAH | Age: 82
Discharge: HOME | End: 2022-09-27
Attending: INTERNAL MEDICINE
Payer: MEDICARE

## 2022-09-27 VITALS — SYSTOLIC BLOOD PRESSURE: 130 MMHG | DIASTOLIC BLOOD PRESSURE: 85 MMHG

## 2022-09-27 VITALS — SYSTOLIC BLOOD PRESSURE: 150 MMHG | DIASTOLIC BLOOD PRESSURE: 96 MMHG

## 2022-09-27 VITALS — DIASTOLIC BLOOD PRESSURE: 96 MMHG | SYSTOLIC BLOOD PRESSURE: 153 MMHG

## 2022-09-27 VITALS — DIASTOLIC BLOOD PRESSURE: 90 MMHG | SYSTOLIC BLOOD PRESSURE: 155 MMHG

## 2022-09-27 VITALS — DIASTOLIC BLOOD PRESSURE: 81 MMHG | SYSTOLIC BLOOD PRESSURE: 141 MMHG

## 2022-09-27 VITALS — DIASTOLIC BLOOD PRESSURE: 85 MMHG | SYSTOLIC BLOOD PRESSURE: 153 MMHG

## 2022-09-27 VITALS — DIASTOLIC BLOOD PRESSURE: 79 MMHG | SYSTOLIC BLOOD PRESSURE: 136 MMHG

## 2022-09-27 VITALS — DIASTOLIC BLOOD PRESSURE: 75 MMHG | SYSTOLIC BLOOD PRESSURE: 131 MMHG

## 2022-09-27 VITALS — DIASTOLIC BLOOD PRESSURE: 89 MMHG | SYSTOLIC BLOOD PRESSURE: 156 MMHG

## 2022-09-27 VITALS — SYSTOLIC BLOOD PRESSURE: 142 MMHG | DIASTOLIC BLOOD PRESSURE: 81 MMHG

## 2022-09-27 VITALS — DIASTOLIC BLOOD PRESSURE: 78 MMHG | SYSTOLIC BLOOD PRESSURE: 138 MMHG

## 2022-09-27 VITALS — DIASTOLIC BLOOD PRESSURE: 66 MMHG | SYSTOLIC BLOOD PRESSURE: 142 MMHG

## 2022-09-27 VITALS — WEIGHT: 183.6 LBS | BODY MASS INDEX: 27.83 KG/M2 | HEIGHT: 68 IN

## 2022-09-27 DIAGNOSIS — Z72.89: ICD-10-CM

## 2022-09-27 DIAGNOSIS — Z98.890: ICD-10-CM

## 2022-09-27 DIAGNOSIS — Z79.01: ICD-10-CM

## 2022-09-27 DIAGNOSIS — I87.2: ICD-10-CM

## 2022-09-27 DIAGNOSIS — K21.9: ICD-10-CM

## 2022-09-27 DIAGNOSIS — Z79.899: ICD-10-CM

## 2022-09-27 DIAGNOSIS — I10: ICD-10-CM

## 2022-09-27 DIAGNOSIS — F32.A: ICD-10-CM

## 2022-09-27 DIAGNOSIS — J44.9: ICD-10-CM

## 2022-09-27 DIAGNOSIS — E78.49: ICD-10-CM

## 2022-09-27 DIAGNOSIS — I25.110: Primary | ICD-10-CM

## 2022-09-27 DIAGNOSIS — I48.0: ICD-10-CM

## 2022-09-27 PROCEDURE — 93005 ELECTROCARDIOGRAM TRACING: CPT

## 2022-09-27 PROCEDURE — 36415 COLL VENOUS BLD VENIPUNCTURE: CPT

## 2022-09-27 PROCEDURE — 99157 MOD SED OTHER PHYS/QHP EA: CPT

## 2022-09-27 PROCEDURE — 93458 L HRT ARTERY/VENTRICLE ANGIO: CPT

## 2022-09-27 PROCEDURE — 83880 ASSAY OF NATRIURETIC PEPTIDE: CPT

## 2022-09-27 PROCEDURE — 85025 COMPLETE CBC W/AUTO DIFF WBC: CPT

## 2022-09-27 PROCEDURE — 99156 MOD SED OTH PHYS/QHP 5/>YRS: CPT

## 2022-09-27 PROCEDURE — 85610 PROTHROMBIN TIME: CPT

## 2022-09-27 PROCEDURE — 80048 BASIC METABOLIC PNL TOTAL CA: CPT

## 2022-09-27 PROCEDURE — 87088 URINE BACTERIA CULTURE: CPT

## 2022-09-27 PROCEDURE — 85730 THROMBOPLASTIN TIME PARTIAL: CPT

## 2022-09-27 PROCEDURE — 81001 URINALYSIS AUTO W/SCOPE: CPT

## 2022-10-20 ENCOUNTER — HOSPITAL ENCOUNTER (OUTPATIENT)
Dept: HOSPITAL 90 - WHH | Age: 82
Discharge: HOME | End: 2022-10-20
Attending: FAMILY MEDICINE
Payer: MEDICARE

## 2022-10-20 DIAGNOSIS — F41.9: ICD-10-CM

## 2022-10-20 DIAGNOSIS — I25.10: ICD-10-CM

## 2022-10-20 DIAGNOSIS — Z79.01: ICD-10-CM

## 2022-10-20 DIAGNOSIS — Z90.710: ICD-10-CM

## 2022-10-20 DIAGNOSIS — Z90.11: ICD-10-CM

## 2022-10-20 DIAGNOSIS — Z96.653: ICD-10-CM

## 2022-10-20 DIAGNOSIS — I11.0: ICD-10-CM

## 2022-10-20 DIAGNOSIS — J44.9: ICD-10-CM

## 2022-10-20 DIAGNOSIS — Z85.3: ICD-10-CM

## 2022-10-20 DIAGNOSIS — M19.90: ICD-10-CM

## 2022-10-20 DIAGNOSIS — I50.43: ICD-10-CM

## 2022-10-20 DIAGNOSIS — Z87.891: ICD-10-CM

## 2022-10-20 DIAGNOSIS — K21.9: ICD-10-CM

## 2022-10-20 DIAGNOSIS — E78.00: ICD-10-CM

## 2022-10-20 DIAGNOSIS — E78.49: ICD-10-CM

## 2022-10-20 DIAGNOSIS — Z79.899: ICD-10-CM

## 2022-10-20 DIAGNOSIS — L92.9: ICD-10-CM

## 2022-10-20 DIAGNOSIS — I48.0: ICD-10-CM

## 2022-10-20 DIAGNOSIS — L97.812: Primary | ICD-10-CM

## 2022-10-20 DIAGNOSIS — F32.A: ICD-10-CM

## 2022-10-20 DIAGNOSIS — I89.0: ICD-10-CM

## 2022-10-20 PROCEDURE — 11104 PUNCH BX SKIN SINGLE LESION: CPT

## 2022-10-27 ENCOUNTER — HOSPITAL ENCOUNTER (OUTPATIENT)
Dept: HOSPITAL 90 - WHH | Age: 82
Discharge: HOME | End: 2022-10-27
Attending: FAMILY MEDICINE
Payer: MEDICARE

## 2022-10-27 DIAGNOSIS — Z87.891: ICD-10-CM

## 2022-10-27 DIAGNOSIS — I25.10: ICD-10-CM

## 2022-10-27 DIAGNOSIS — Z96.653: ICD-10-CM

## 2022-10-27 DIAGNOSIS — M19.90: ICD-10-CM

## 2022-10-27 DIAGNOSIS — J44.9: ICD-10-CM

## 2022-10-27 DIAGNOSIS — F41.9: ICD-10-CM

## 2022-10-27 DIAGNOSIS — Z85.3: ICD-10-CM

## 2022-10-27 DIAGNOSIS — E78.49: ICD-10-CM

## 2022-10-27 DIAGNOSIS — Z79.01: ICD-10-CM

## 2022-10-27 DIAGNOSIS — L97.812: Primary | ICD-10-CM

## 2022-10-27 DIAGNOSIS — I11.0: ICD-10-CM

## 2022-10-27 DIAGNOSIS — K21.9: ICD-10-CM

## 2022-10-27 DIAGNOSIS — E78.00: ICD-10-CM

## 2022-10-27 DIAGNOSIS — I50.43: ICD-10-CM

## 2022-10-27 DIAGNOSIS — Z90.710: ICD-10-CM

## 2022-10-27 DIAGNOSIS — I87.2: ICD-10-CM

## 2022-10-27 DIAGNOSIS — Z90.11: ICD-10-CM

## 2022-10-27 DIAGNOSIS — F32.A: ICD-10-CM

## 2022-10-27 DIAGNOSIS — Z79.899: ICD-10-CM

## 2022-10-27 DIAGNOSIS — I48.0: ICD-10-CM

## 2022-10-27 DIAGNOSIS — I89.0: ICD-10-CM

## 2022-10-27 DIAGNOSIS — L92.9: ICD-10-CM

## 2022-10-27 PROCEDURE — 11042 DBRDMT SUBQ TIS 1ST 20SQCM/<: CPT

## 2023-03-27 ENCOUNTER — HOSPITAL ENCOUNTER (OUTPATIENT)
Dept: HOSPITAL 90 - RAH | Age: 83
Discharge: HOME | End: 2023-03-27
Attending: OTOLARYNGOLOGY
Payer: MEDICARE

## 2023-03-27 DIAGNOSIS — R13.19: ICD-10-CM

## 2023-03-27 DIAGNOSIS — K22.5: Primary | ICD-10-CM

## 2023-03-27 PROCEDURE — 74220 X-RAY XM ESOPHAGUS 1CNTRST: CPT

## 2024-08-16 ENCOUNTER — HOSPITAL ENCOUNTER (OUTPATIENT)
Dept: HOSPITAL 90 - SHCH | Age: 84
Discharge: HOME | End: 2024-08-16
Attending: INTERNAL MEDICINE
Payer: MEDICARE

## 2024-08-16 DIAGNOSIS — R06.09: Primary | ICD-10-CM

## 2024-08-16 DIAGNOSIS — R07.9: ICD-10-CM

## 2024-08-16 PROCEDURE — 93306 TTE W/DOPPLER COMPLETE: CPT

## 2025-02-05 ENCOUNTER — HOSPITAL ENCOUNTER (OUTPATIENT)
Dept: HOSPITAL 90 - SHCH | Age: 85
Discharge: HOME | End: 2025-02-05
Attending: INTERNAL MEDICINE
Payer: MEDICARE

## 2025-02-05 DIAGNOSIS — R01.1: ICD-10-CM

## 2025-02-05 DIAGNOSIS — I08.3: Primary | ICD-10-CM

## 2025-02-05 PROCEDURE — 93306 TTE W/DOPPLER COMPLETE: CPT

## 2025-02-06 NOTE — [facility]
--------------- APPROVED REPORT --------------





EXAM: Two-dimensional and M-mode echocardiogram with Doppler and color Doppler.



INDICATION

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 

Murmur 



2D Dimensions

RVDd4.6 cmLVEF(%)50.0 (>50%)LVED Vol(simp.)103.0 mL

IVSd1.3 (0.7-1.1cm)FS(%)25 %LVES Vol(simp.)55.0 mL

LVDd5.0 (3.8-5.6cm)Ao Root(2D)3.2 (2.0-3.7cm)LVEF(%, simp.)46 %

PWd1.3 (0.7-1.1cm)LVOT diam2.0 (1.8-2.4cm)LA ESV INDEX (BP)85.56 mL/m2

LVDs3.8 (2.5-4.0cm)IVC diam1.9 cm



Aortic Valve

AoV Vmax1.5 m/Hayder Peak GR8.8 mmHgLVOT Vmax0.8 m/s

AoV VTI0.3 mAo Mean GR4.6 mmHgLVOT VTI0.17 m

LYNDON (VMAX)1.7 cm2Al P1/2T520 msAVA (VTI) 1.7 cm2



Mitral Valve

MV E Uiwq970.0 cm/sDECEL Qbol492 ms

MV A Vmax33.4 cm/sP 1/2 T42 ms

E/A ratio3.4MVA (PHT)5.2 cm2

MR Max  mmHg



TDI

E/E' Lxndax38.9E/E' Lateral9.9



Pulmonary Valve

PV Vmax0.9 m/sPV VTI0.18 mPV Mean GR2 mmHg

PV Peak GR3.3 mmHg



Tricuspid Valve

TR Vmax3.8 m/sRAP (EST) 8 soJqRPRP96.6 mmHg

TR Peak GR57.6 mmHg



Left Ventricle

The left ventricle structure and function is normal. There is normal LV segmental wall motion. There 
is mild concentric left ventricular hypertrophy. LVEF is 55-60%. Indeterminate diastolic dysfunction.




Right Ventricle

The right ventricle is moderately dilated. The right ventricular systolic function is normal.



Atria

The left atrium is severely dilated. The right atrium is severely dilated.



Aortic Valve

Aortic valve is trileaflet. Aortic valve leaflets are sclerotic but open well. Mild aortic regurgitat
ion. Calculated aortic valve area is 1.7 cm2 with maximum pressure gradient of 8.8 mmHg and mean pres
sure gradient of 4.6 mmHg.



Mitral Valve

Mitral valve leaflets are mildly sclerotic but open well. Mitral annular calcification is mild. Kaylan
l regurgitation is moderate. There is no mitral valve stenosis.



Tricuspid Valve

The tricuspid valve leaflets appear normal. There is severe tricuspid regurgitation. Right ventricula
r systolic pressure is estimated at greater than 60 mmHg. 



Pulmonic Valve

The pulmonic valve leaflets are thin and pliable; valve motion is normal. There is trace pulmonic komal
vular regurgitation.



Great Vessels

The aortic root is normal in size. IVC is dilated and collapses >50% with inspiration.



Pericardium

No pericardial effusion.



Conclusion

LVEF is 55-60%.

There is mild concentric left ventricular hypertrophy.

There is normal LV segmental wall motion.

The right ventricle is moderately dilated.

The left atrium is severely dilated.

The right atrium is severely dilated.

Mild aortic regurgitation.

Mitral valve leaflets are mildly sclerotic but open well.

Mitral annular calcification is mild.

Mitral regurgitation is moderate.

There is severe tricuspid regurgitation.

Right ventricular systolic pressure is estimated at greater than 60 mmHg.

No pericardial effusion.